# Patient Record
Sex: FEMALE | Race: WHITE | Employment: STUDENT | URBAN - METROPOLITAN AREA
[De-identification: names, ages, dates, MRNs, and addresses within clinical notes are randomized per-mention and may not be internally consistent; named-entity substitution may affect disease eponyms.]

---

## 2017-08-03 ENCOUNTER — HOSPITAL ENCOUNTER (EMERGENCY)
Age: 37
Discharge: LEFT BEFORE DISCHARGE | End: 2017-08-03

## 2017-08-03 PROCEDURE — 4500000002 HC ER NO CHARGE

## 2021-01-06 ENCOUNTER — ANESTHESIA (OUTPATIENT)
Dept: OPERATING ROOM | Age: 41
End: 2021-01-06
Payer: MEDICAID

## 2021-01-06 ENCOUNTER — HOSPITAL ENCOUNTER (OUTPATIENT)
Age: 41
Setting detail: OUTPATIENT SURGERY
Discharge: HOME | End: 2021-01-06
Attending: ORTHOPAEDIC SURGERY | Admitting: ORTHOPAEDIC SURGERY
Payer: MEDICAID

## 2021-01-06 ENCOUNTER — ANESTHESIA EVENT (OUTPATIENT)
Dept: OPERATING ROOM | Age: 41
End: 2021-01-06
Payer: MEDICAID

## 2021-01-06 ENCOUNTER — APPOINTMENT (OUTPATIENT)
Dept: GENERAL RADIOLOGY | Age: 41
End: 2021-01-06
Attending: ORTHOPAEDIC SURGERY
Payer: MEDICAID

## 2021-01-06 VITALS
HEIGHT: 61 IN | WEIGHT: 93 LBS | DIASTOLIC BLOOD PRESSURE: 69 MMHG | OXYGEN SATURATION: 96 % | HEART RATE: 90 BPM | SYSTOLIC BLOOD PRESSURE: 112 MMHG | BODY MASS INDEX: 17.56 KG/M2 | TEMPERATURE: 97.6 F | RESPIRATION RATE: 16 BRPM

## 2021-01-06 VITALS — SYSTOLIC BLOOD PRESSURE: 113 MMHG | DIASTOLIC BLOOD PRESSURE: 67 MMHG | OXYGEN SATURATION: 98 %

## 2021-01-06 LAB
HCG, URINE, POC: NEGATIVE
Lab: NORMAL
NEGATIVE QC PASS/FAIL: NORMAL
POSITIVE QC PASS/FAIL: NORMAL
SARS-COV-2, NAAT: NOT DETECTED

## 2021-01-06 PROCEDURE — 2709999900 HC NON-CHARGEABLE SUPPLY: Performed by: ORTHOPAEDIC SURGERY

## 2021-01-06 PROCEDURE — 6360000002 HC RX W HCPCS: Performed by: ORTHOPAEDIC SURGERY

## 2021-01-06 PROCEDURE — 3700000001 HC ADD 15 MINUTES (ANESTHESIA): Performed by: ORTHOPAEDIC SURGERY

## 2021-01-06 PROCEDURE — 3209999900 FLUORO FOR SURGICAL PROCEDURES

## 2021-01-06 PROCEDURE — U0002 COVID-19 LAB TEST NON-CDC: HCPCS

## 2021-01-06 PROCEDURE — 3600000014 HC SURGERY LEVEL 4 ADDTL 15MIN: Performed by: ORTHOPAEDIC SURGERY

## 2021-01-06 PROCEDURE — 3600000004 HC SURGERY LEVEL 4 BASE: Performed by: ORTHOPAEDIC SURGERY

## 2021-01-06 PROCEDURE — 3700000000 HC ANESTHESIA ATTENDED CARE: Performed by: ORTHOPAEDIC SURGERY

## 2021-01-06 PROCEDURE — 2580000003 HC RX 258

## 2021-01-06 RX ORDER — SODIUM CHLORIDE, SODIUM LACTATE, POTASSIUM CHLORIDE, CALCIUM CHLORIDE 600; 310; 30; 20 MG/100ML; MG/100ML; MG/100ML; MG/100ML
INJECTION, SOLUTION INTRAVENOUS
Status: COMPLETED
Start: 2021-01-06 | End: 2021-01-06

## 2021-01-06 RX ORDER — CEFAZOLIN SODIUM 2 G/50ML
2 SOLUTION INTRAVENOUS
Status: COMPLETED | OUTPATIENT
Start: 2021-01-06 | End: 2021-01-06

## 2021-01-06 RX ORDER — SODIUM CHLORIDE, SODIUM LACTATE, POTASSIUM CHLORIDE, CALCIUM CHLORIDE 600; 310; 30; 20 MG/100ML; MG/100ML; MG/100ML; MG/100ML
INJECTION, SOLUTION INTRAVENOUS CONTINUOUS
Status: DISCONTINUED | OUTPATIENT
Start: 2021-01-06 | End: 2021-01-06 | Stop reason: HOSPADM

## 2021-01-06 RX ADMIN — SODIUM CHLORIDE, SODIUM LACTATE, POTASSIUM CHLORIDE, CALCIUM CHLORIDE 1000 ML: 600; 310; 30; 20 INJECTION, SOLUTION INTRAVENOUS at 07:13

## 2021-01-06 RX ADMIN — SODIUM CHLORIDE, POTASSIUM CHLORIDE, SODIUM LACTATE AND CALCIUM CHLORIDE 1000 ML: 600; 310; 30; 20 INJECTION, SOLUTION INTRAVENOUS at 07:13

## 2021-01-06 RX ADMIN — CEFAZOLIN SODIUM 2 G: 2 SOLUTION INTRAVENOUS at 08:16

## 2021-01-06 ASSESSMENT — LIFESTYLE VARIABLES: SMOKING_STATUS: 1

## 2021-01-06 ASSESSMENT — PULMONARY FUNCTION TESTS
PIF_VALUE: 0
PIF_VALUE: 1
PIF_VALUE: 0

## 2021-01-06 NOTE — OP NOTE
Operative Note      Patient: Zhane Ely  YOB: 1980  MRN: [id number]    Date of Procedure: 1/6/2021    Preoperative diagnosis: Left ring finger middle phalanx fracture    Postoperative diagnosis: Same     Procedure planned: Closed reduction percutaneous left ring finger middle phalanx fracture    Procedure performed: Same    Surgeon: Army Renetta D.O. Assistant: MIKE Beckett  The physician assistant was present through the entire case. Given the nature of the disease process and the procedure to be performed a skilled surgical assistant was necessary during the case. The assistant was necessary in order to hold retractors and directly assist in the operation. A certified scrub tech was at the back table managing instruments and supplies for the surgical case. Anesthesia: Digital block monitored by the anesthesia team    Estimated blood loss: Less than 10 cc    Drains: None    Tourniquet: [place] for approximately 5 minutes    Specimens: None    Implants: K wires    Indications: The patient sustained injury to the left ring finger middle phalanx. Injury films show displaced phalangeal neck fracture. She underwent a closed reduction in the emergency department. She was seen in the office and operative and nonoperative treatment strategies were discussed. Recommendations were made for operative stabilization by way of percutaneous pinning. After full discussion regarding risks benefits and alternatives the patient elected to proceed forth with surgery. Informed consent was signed and placed in the chart. Complications: None noted at the time of surgery     Description of operation: The patient was taken to the operative suite and placed in the supine position on the operating table. A timeout was performed and the left ring finger was confirmed to be the operative site.   She was carefully positioned on the table in such a fashion as to pad all bony prominences and peripheral nerves. She was administered appropriate IV antibiotics. The digital block was working well. She was prepped and draped in the normal sterile fashion. Fluoroscopic imaging was brought in. The distal segment of the middle phalanx phalangeal neck fracture was translated slightly radial.  Some pressure was used to reduce the fracture. Reduction was checked in AP and lateral planes. While holding the reduction force a K wire was started about the radial aspect of the distal segment and delivered on the oblique from distal to proximal and from radial to ulnar traversing the fracture plane and anchoring into the shaft segment. In a similar fashion a second K wire was started at the ulnar aspect of the distal segment and passed on the oblique from distal to proximal and from ulnar to radial traversing the fracture plane anchoring into the shaft segment. This finalized the construct. The K wires were cut outside of the skin and capped with Jurgan's balls. A soft dressing was placed along with AlumaFoam splint allowing for PIP range of motion. The patient was taken to recovery in stable condition. Overall she tolerated the procedure well.      Disposition: Stable to PACU      Electronically signed by Isis Gutierrez DO on 1/6/2021 at 8:38 AM

## 2021-01-06 NOTE — ANESTHESIA PRE PROCEDURE
Department of Anesthesiology  Preprocedure Note       Name:  Jen Hanson   Age:  38 y.o.  :  1980                                          MRN:  [id number]         Date:  2021      Surgeon: Zaida Garza):  Abbi Simon DO    Procedure: Procedure(s):  LEFT CLOSED REDUCTION INTERNAL FIXATION VERSUS OPEN REDUCTION INTERNAL FIXATION LEFT RING FINGER MIDDLE PHALANX FRACTURE DIGITAL BLOCK, SUPINE C-ARM LOCAL, MAC  LATEX ALLERGY    Medications prior to admission:   Prior to Admission medications    Not on File       Current medications:    Current Facility-Administered Medications   Medication Dose Route Frequency Provider Last Rate Last Admin    ceFAZolin (ANCEF) 2 g in dextrose 3 % 50 mL IVPB (duplex)  2 g Intravenous On Call to Amari Myles DO        lactated ringers infusion   Intravenous Continuous Efra Rene  mL/hr at 21 0713 1,000 mL at 21       Allergies: Allergies   Allergen Reactions    Latex Itching and Rash     Added based on information entered during case entry, please review and add reactions, type, and severity as needed    Nickel Rash and Itching    Cholecalciferol Itching    Other Hives       Problem List:  There is no problem list on file for this patient. Past Medical History:  History reviewed. No pertinent past medical history. Past Surgical History:  History reviewed. No pertinent surgical history.     Social History:    Social History     Tobacco Use    Smoking status: Current Every Day Smoker    Smokeless tobacco: Never Used   Substance Use Topics    Alcohol use: Not Currently                                Ready to quit: Not Answered  Counseling given: Not Answered      Vital Signs (Current):   Vitals:    21 0650   BP: 112/69   Pulse: 90   Resp: 16   Temp: 97.6 °F (36.4 °C)   TempSrc: Temporal   SpO2: 96%   Weight: 93 lb (42.2 kg)   Height: 5' 1\" (1.549 m) BP Readings from Last 3 Encounters:   01/06/21 112/69       NPO Status: Time of last liquid consumption: 2100                        Time of last solid consumption: 1800                        Date of last liquid consumption: 01/05/21                        Date of last solid food consumption: 01/05/21    BMI:   Wt Readings from Last 3 Encounters:   01/06/21 93 lb (42.2 kg)     Body mass index is 17.57 kg/m². CBC: No results found for: WBC, RBC, HGB, HCT, MCV, RDW, PLT    CMP: No results found for: NA, K, CL, CO2, BUN, CREATININE, GFRAA, AGRATIO, LABGLOM, GLUCOSE, PROT, CALCIUM, BILITOT, ALKPHOS, AST, ALT    POC Tests: No results for input(s): POCGLU, POCNA, POCK, POCCL, POCBUN, POCHEMO, POCHCT in the last 72 hours. Coags: No results found for: PROTIME, INR, APTT    HCG (If Applicable): No results found for: PREGTESTUR, PREGSERUM, HCG, HCGQUANT     ABGs: No results found for: PHART, PO2ART, VJK7XVX, UDF2OEE, BEART, R7VEAHST     Type & Screen (If Applicable):  No results found for: LABABO, LABRH    Drug/Infectious Status (If Applicable):  No results found for: HIV, HEPCAB    COVID-19 Screening (If Applicable):   Lab Results   Component Value Date    COVID19 Not Detected 01/06/2021         Anesthesia Evaluation    Airway: Mallampati: II  TM distance: >3 FB   Neck ROM: full  Mouth opening: > = 3 FB Dental: normal exam         Pulmonary: breath sounds clear to auscultation  (+) current smoker          Patient smoked on day of surgery. Cardiovascular:Negative CV ROS            Rhythm: regular                      Neuro/Psych:   Negative Neuro/Psych ROS              GI/Hepatic/Renal: Neg GI/Hepatic/Renal ROS            Endo/Other: Negative Endo/Other ROS                    Abdominal:           Vascular: negative vascular ROS. Anesthesia Plan      MAC     ASA 2       Induction: intravenous. MIPS: Prophylactic antiemetics administered. Anesthetic plan and risks discussed with patient. Plan discussed with CRNA.     Attending anesthesiologist reviewed and agrees with Pre Eval content              Ana Mao MD   1/6/2021

## 2021-01-06 NOTE — ANESTHESIA POSTPROCEDURE EVALUATION
Department of Anesthesiology  Postprocedure Note    Patient: Tyron Gamez  MRN: [id number]  YOB: 1980  Date of evaluation: 1/6/2021  Time:  8:47 AM     Procedure Summary     Date: 01/06/21 Room / Location: [address]    Anesthesia Start: 0813 Anesthesia Stop: 7080    Procedure: LEFT CLOSED REDUCTION INTERNAL FIXATION VERSUS OPEN REDUCTION INTERNAL FIXATION LEFT RING FINGER MIDDLE PHALANX FRACTURE (Left Hand) Diagnosis: (LEFT RING FINGER MIDDLE PHALANX FRACTURE)    Surgeons: Alejandro Banks DO Responsible Provider: Shannon Shane MD    Anesthesia Type: MAC ASA Status: 2          Anesthesia Type: MAC    [place]esh Phase I:      M[place]esh Phase II:      Last vitals: Reviewed and per EMR flowsheets.        Anesthesia Post Evaluation    Patient location during evaluation: PACU  Patient participation: complete - patient participated  Level of consciousness: awake and alert  Pain score: 0  Airway patency: patent  Nausea & Vomiting: no nausea and no vomiting  Complications: no  Cardiovascular status: blood pressure returned to baseline and hemodynamically stable  Respiratory status: acceptable  Hydration status: euvolemic

## (undated) DEVICE — GLOVE ORANGE PI 7 1/2   MSG9075

## (undated) DEVICE — COVER LT HNDL BLU PLAS

## (undated) DEVICE — SPLINT CAST W3XL15IN GRN STRENGTH PLSTR OF PARIS FAST SET

## (undated) DEVICE — GLOVE ORANGE PI 7   MSG9070

## (undated) DEVICE — BANDAGE COMPR W3INXL5YD HI E BGE W/ CLP SURE-WRAP

## (undated) DEVICE — LABEL MED MINI W/ MARKER

## (undated) DEVICE — APPLICATOR MEDICATED 26 CC SOLUTION HI LT ORNG CHLORAPREP

## (undated) DEVICE — PADDING CAST N ADH 4 YDX3 IN HIGHLY ABSORBENT EZ APPL SOFROL

## (undated) DEVICE — SPONGE GZ W4XL4IN COT 12 PLY TYP VII WVN C FLD DSGN

## (undated) DEVICE — DRESSING PETRO W3XL8IN OIL EMUL N ADH GZ KNIT IMPREG CELOS

## (undated) DEVICE — HAND II: Brand: MEDLINE INDUSTRIES, INC.

## (undated) DEVICE — DRAPE SURG W41XL74IN CLR FULL SZ C ARM 3 ADH POLY STRP E

## (undated) DEVICE — GOWN,AURORA,NONREINFORCED,LARGE: Brand: MEDLINE